# Patient Record
Sex: MALE | Race: WHITE
[De-identification: names, ages, dates, MRNs, and addresses within clinical notes are randomized per-mention and may not be internally consistent; named-entity substitution may affect disease eponyms.]

---

## 2020-08-27 ENCOUNTER — HOSPITAL ENCOUNTER (EMERGENCY)
Dept: HOSPITAL 75 - ER | Age: 30
Discharge: HOME | End: 2020-08-27
Payer: COMMERCIAL

## 2020-08-27 VITALS — WEIGHT: 213.85 LBS | HEIGHT: 70.87 IN | BODY MASS INDEX: 29.94 KG/M2

## 2020-08-27 VITALS — SYSTOLIC BLOOD PRESSURE: 119 MMHG | DIASTOLIC BLOOD PRESSURE: 71 MMHG

## 2020-08-27 DIAGNOSIS — J45.909: ICD-10-CM

## 2020-08-27 DIAGNOSIS — F17.210: ICD-10-CM

## 2020-08-27 DIAGNOSIS — Z82.49: ICD-10-CM

## 2020-08-27 DIAGNOSIS — L29.8: Primary | ICD-10-CM

## 2020-08-27 DIAGNOSIS — Z79.52: ICD-10-CM

## 2020-08-27 PROCEDURE — 99283 EMERGENCY DEPT VISIT LOW MDM: CPT

## 2020-08-27 RX ADMIN — DIPHENHYDRAMINE HCL ONE MG: 25 TABLET ORAL at 05:20

## 2020-08-27 NOTE — ED INTEGUMENTARY GENERAL
General


Chief Complaint:  Allergic Reaction


Stated Complaint:  ALLERGIC RXN


Source:  patient


Exam Limitations:  no limitations





History of Present Illness


Date Seen by Provider:  Aug 27, 2020


Time Seen by Provider:  05:02


Initial Comments


patient presents to ER by private conveyance from home with chief complaint he 

is having itching all over this evening. He thinks allergic reaction may be 

related to some zinc or paint. 


He is not having any shortness of breath, wheezing, coughing or constriction in 

the throat. No prior history of anaphylaxis or allergies.





Allergies and Home Medications


Allergies


Coded Allergies:  


     No Known Drug Allergies (Unverified , 8/10/15)





Home Medications


Cyclobenzaprine HCl 10 Mg Tablet, 10 MG PO Q8H PRN for SPASMS


   Prescribed by: KATY STOREY on 8/13/19 0848


Prednisone 20 Mg Tab, 20 MG PO DAILY


   Prescribed by: IVIS GAR on 3/26/17 0453





Patient Home Medication List


Home Medication List Reviewed:  Yes





Review of Systems


Review of Systems


Constitutional:  No fever, No malaise


EENTM:  No ear discharge, No ear pain


Respiratory:  No cough, No short of breath, No stridor, No wheezing


Cardiovascular:  No edema, No Hx of Intervention, No palpitations


Gastrointestinal:  No abdominal pain, No nausea, No vomiting


Genitourinary:  No discharge, No dysuria


Skin:  see HPI; No change in color; pruritus; No rash





All Other Systems Reviewed


Negative Unless Noted:  Yes





Past Medical-Social-Family Hx


Patient Social History


Recreational Drug Use:  No


Smoking Status:  Current Everyday Smoker


Type Used:  Cigarettes


Recent Foreign Travel:  No


Contact w/Someone Who Travel:  No


Recent Hopitalizations:  No





Immunizations Up To Date


Tetanus Booster (TDap):  Unknown





Seasonal Allergies


Seasonal Allergies:  Yes





Past Medical History


Surgeries:  No


Respiratory:  Yes


Asthma


Currently Using CPAP:  No


Currently Using BIPAP:  No


Cardiac:  No


Neurological:  No


Reproductive Disorders:  No


Genitourinary:  No


Gastrointestinal:  No


Musculoskeletal:  Yes


Fractures


Endocrine:  No


HEENT:  No


Loss of Vision:  Denies


Hearing Impairment:  Denies


Cancer:  No


Psychosocial:  No


Integumentary:  No


Blood Disorders:  No





Family Medical History


Heart Disease, Cancer





Physical Exam


Vital Signs





Vital Signs - First Documented








 8/27/20





 05:11


 


Temp 36.7


 


Pulse 78


 


Resp 20


 


B/P (MAP) 119/71 (87)


 


O2 Delivery Room Air





Capillary Refill :


General Appearance:  WD/WN, no apparent distress


HEENT:  PERRL/EOMI, TMs normal, pharynx normal


Neck:  full range of motion, normal inspection


Cardiovascular:  normal peripheral pulses, regular rate, rhythm


Respiratory:  lungs clear, normal breath sounds, no respiratory distress, no 

accessory muscle use


Extremities:  normal range of motion, no pedal edema, normal capillary refill


Neurologic/Psychiatric:  alert, oriented x 3, other (irregular, jerking, 

tweaking-like motions bilaterally)


Skin:  normal color, warm/dry, other (intense pruritus without any erythema, 

hives, rash.)





Progress/Results/Core Measures


Results/Orders


My Orders





Orders - KATY STOREY


Diphenhydramine Tablet (Benadryl Tablet) (8/27/20 05:15)





Medications Given in ED





Current Medications








 Medications  Dose


 Ordered  Sig/Amol


 Route  Start Time


 Stop Time Status Last Admin


Dose Admin


 


 Diphenhydramine


 HCl  50 mg  ONCE  ONCE


 PO  8/27/20 05:15


 8/27/20 05:16 DC 8/27/20 05:20


50 MG








Vital Signs/I&O











 8/27/20





 05:11


 


Temp 36.7


 


Pulse 78


 


Resp 20


 


B/P (MAP) 119/71 (87)


 


O2 Delivery Room Air











Progress


Progress Note #1:  


   Time:  05:23


Progress Note


no airway involvement. Intense pruritus all over without erythema or rash. 50 mg

Benadryl ordered. Patient declined any shots.


Progress Note #2:  


   Time:  05:36


Progress Note


the patient and some marginal improvement in symptoms. We have given him return 

precautions encourage him to  some Claritin and Zyrtec. we have given 

return precautions and instructions to clean his skin. The patient does not have

any airway compromise at this time however he was encouraged to return to the ER

should he start to have difficulty breathing. Patient states that if he has a 

hard time breathing then he is going to fight everyone.





Departure


Impression





   Primary Impression:  


   Generalized pruritus


Disposition:  01 HOME, SELF-CARE


Condition:  Stable





Departure-Patient Inst.


Decision time for Depature:  05:38


Referrals:  


NO,LOCAL PHYSICIAN (PCP/Family)


Primary Care Physician


Patient Instructions:  Itchy Skin





Add. Discharge Instructions:  


loratadine/Claritin or Zyrtec/cetirizine 10 mg daily until your symptoms ceased.


Benadryl 1-2 tablets every 6 hours as necessary for breakthrough itching.


famotidine 20 mg twice a day.


Hypoallergenic lotion such as Eucerin, Nutraderm, CeraVe. 





All discharge instructions reviewed with patient and/or family. Voiced 

understanding.


Work/School Note:  Work Release Form   Date Seen in the Emergency Department:  

Aug 27, 2020


   Return to Work:  Aug 28, 2020


   Restrictions:  No Restrictions











KATY STOREY                 Aug 27, 2020 05:21